# Patient Record
Sex: FEMALE | Race: ASIAN
[De-identification: names, ages, dates, MRNs, and addresses within clinical notes are randomized per-mention and may not be internally consistent; named-entity substitution may affect disease eponyms.]

---

## 2017-06-09 ENCOUNTER — HOSPITAL ENCOUNTER (EMERGENCY)
Dept: HOSPITAL 8 - ED | Age: 27
Discharge: HOME | End: 2017-06-09
Payer: SELF-PAY

## 2017-06-09 VITALS — WEIGHT: 184.31 LBS | BODY MASS INDEX: 29.62 KG/M2 | HEIGHT: 66 IN

## 2017-06-09 VITALS — DIASTOLIC BLOOD PRESSURE: 79 MMHG | SYSTOLIC BLOOD PRESSURE: 122 MMHG

## 2017-06-09 DIAGNOSIS — O20.0: Primary | ICD-10-CM

## 2017-06-09 DIAGNOSIS — Z3A.10: ICD-10-CM

## 2017-06-09 DIAGNOSIS — O99.331: ICD-10-CM

## 2017-06-09 DIAGNOSIS — Z88.0: ICD-10-CM

## 2017-06-09 LAB
PATH.CAST-FLAG: (no result)
SPERM-FLAG: (no result)
SRC-FLAG: (no result)
XTAL-FLAG: (no result)
YLC-FLAG: (no result)

## 2017-06-09 PROCEDURE — 36415 COLL VENOUS BLD VENIPUNCTURE: CPT

## 2017-06-09 PROCEDURE — 99285 EMERGENCY DEPT VISIT HI MDM: CPT

## 2017-06-09 PROCEDURE — 85025 COMPLETE CBC W/AUTO DIFF WBC: CPT

## 2017-06-09 PROCEDURE — 84702 CHORIONIC GONADOTROPIN TEST: CPT

## 2017-06-09 PROCEDURE — 76856 US EXAM PELVIC COMPLETE: CPT

## 2017-06-09 PROCEDURE — 86901 BLOOD TYPING SEROLOGIC RH(D): CPT

## 2017-06-09 PROCEDURE — 81001 URINALYSIS AUTO W/SCOPE: CPT

## 2017-06-13 ENCOUNTER — HOSPITAL ENCOUNTER (EMERGENCY)
Dept: HOSPITAL 8 - ED | Age: 27
Discharge: HOME | End: 2017-06-13
Payer: COMMERCIAL

## 2017-06-13 VITALS — HEIGHT: 66 IN | WEIGHT: 182.32 LBS | BODY MASS INDEX: 29.3 KG/M2

## 2017-06-13 VITALS — SYSTOLIC BLOOD PRESSURE: 110 MMHG | DIASTOLIC BLOOD PRESSURE: 80 MMHG

## 2017-06-13 DIAGNOSIS — O46.90: Primary | ICD-10-CM

## 2017-06-13 DIAGNOSIS — Z3A.00: ICD-10-CM

## 2017-06-13 PROCEDURE — 99283 EMERGENCY DEPT VISIT LOW MDM: CPT

## 2017-06-13 PROCEDURE — 84702 CHORIONIC GONADOTROPIN TEST: CPT

## 2017-06-13 PROCEDURE — 36415 COLL VENOUS BLD VENIPUNCTURE: CPT
